# Patient Record
Sex: MALE | Race: WHITE | NOT HISPANIC OR LATINO | Employment: OTHER | URBAN - METROPOLITAN AREA
[De-identification: names, ages, dates, MRNs, and addresses within clinical notes are randomized per-mention and may not be internally consistent; named-entity substitution may affect disease eponyms.]

---

## 2019-07-06 ENCOUNTER — OFFICE VISIT (OUTPATIENT)
Dept: URGENT CARE | Facility: CLINIC | Age: 79
End: 2019-07-06
Payer: MEDICARE

## 2019-07-06 ENCOUNTER — HOSPITAL ENCOUNTER (OUTPATIENT)
Dept: LAB | Facility: MEDICAL CENTER | Age: 79
End: 2019-07-06
Attending: NURSE PRACTITIONER
Payer: MEDICARE

## 2019-07-06 VITALS
WEIGHT: 195 LBS | DIASTOLIC BLOOD PRESSURE: 78 MMHG | HEIGHT: 70 IN | SYSTOLIC BLOOD PRESSURE: 112 MMHG | HEART RATE: 65 BPM | BODY MASS INDEX: 27.92 KG/M2 | TEMPERATURE: 98.7 F | RESPIRATION RATE: 18 BRPM | OXYGEN SATURATION: 95 %

## 2019-07-06 DIAGNOSIS — R10.32 LEFT LOWER QUADRANT PAIN: ICD-10-CM

## 2019-07-06 DIAGNOSIS — J02.9 SORE THROAT: ICD-10-CM

## 2019-07-06 DIAGNOSIS — J03.90 TONSILLITIS: ICD-10-CM

## 2019-07-06 DIAGNOSIS — R30.0 DYSURIA: ICD-10-CM

## 2019-07-06 LAB
ALBUMIN SERPL BCP-MCNC: 4.3 G/DL (ref 3.2–4.9)
ALBUMIN/GLOB SERPL: 1.4 G/DL
ALP SERPL-CCNC: 109 U/L (ref 30–99)
ALT SERPL-CCNC: 45 U/L (ref 2–50)
ANION GAP SERPL CALC-SCNC: 9 MMOL/L (ref 0–11.9)
APPEARANCE UR: NORMAL
AST SERPL-CCNC: 31 U/L (ref 12–45)
BASOPHILS # BLD AUTO: 1 % (ref 0–1.8)
BASOPHILS # BLD: 0.08 K/UL (ref 0–0.12)
BILIRUB SERPL-MCNC: 0.7 MG/DL (ref 0.1–1.5)
BILIRUB UR STRIP-MCNC: NORMAL MG/DL
BUN SERPL-MCNC: 27 MG/DL (ref 8–22)
CALCIUM SERPL-MCNC: 10.4 MG/DL (ref 8.5–10.5)
CHLORIDE SERPL-SCNC: 106 MMOL/L (ref 96–112)
CO2 SERPL-SCNC: 23 MMOL/L (ref 20–33)
COLOR UR AUTO: NORMAL
CREAT SERPL-MCNC: 1.29 MG/DL (ref 0.5–1.4)
EOSINOPHIL # BLD AUTO: 0.12 K/UL (ref 0–0.51)
EOSINOPHIL NFR BLD: 1.4 % (ref 0–6.9)
ERYTHROCYTE [DISTWIDTH] IN BLOOD BY AUTOMATED COUNT: 51.7 FL (ref 35.9–50)
GLOBULIN SER CALC-MCNC: 3.1 G/DL (ref 1.9–3.5)
GLUCOSE SERPL-MCNC: 114 MG/DL (ref 65–99)
GLUCOSE UR STRIP.AUTO-MCNC: NORMAL MG/DL
HCT VFR BLD AUTO: 49.8 % (ref 42–52)
HGB BLD-MCNC: 16.9 G/DL (ref 14–18)
IMM GRANULOCYTES # BLD AUTO: 0.03 K/UL (ref 0–0.11)
IMM GRANULOCYTES NFR BLD AUTO: 0.4 % (ref 0–0.9)
INT CON NEG: NORMAL
INT CON POS: NORMAL
KETONES UR STRIP.AUTO-MCNC: NORMAL MG/DL
LEUKOCYTE ESTERASE UR QL STRIP.AUTO: NORMAL
LYMPHOCYTES # BLD AUTO: 1.97 K/UL (ref 1–4.8)
LYMPHOCYTES NFR BLD: 23.5 % (ref 22–41)
MCH RBC QN AUTO: 33.1 PG (ref 27–33)
MCHC RBC AUTO-ENTMCNC: 33.9 G/DL (ref 33.7–35.3)
MCV RBC AUTO: 97.6 FL (ref 81.4–97.8)
MONOCYTES # BLD AUTO: 0.93 K/UL (ref 0–0.85)
MONOCYTES NFR BLD AUTO: 11.1 % (ref 0–13.4)
NEUTROPHILS # BLD AUTO: 5.24 K/UL (ref 1.82–7.42)
NEUTROPHILS NFR BLD: 62.6 % (ref 44–72)
NITRITE UR QL STRIP.AUTO: NORMAL
NRBC # BLD AUTO: 0 K/UL
NRBC BLD-RTO: 0 /100 WBC
PH UR STRIP.AUTO: 5.5 [PH] (ref 5–8)
PLATELET # BLD AUTO: 263 K/UL (ref 164–446)
PMV BLD AUTO: 10.3 FL (ref 9–12.9)
POTASSIUM SERPL-SCNC: 4.2 MMOL/L (ref 3.6–5.5)
PROT SERPL-MCNC: 7.4 G/DL (ref 6–8.2)
PROT UR QL STRIP: NORMAL MG/DL
RBC # BLD AUTO: 5.1 M/UL (ref 4.7–6.1)
RBC UR QL AUTO: NORMAL
S PYO AG THROAT QL: NEGATIVE
SODIUM SERPL-SCNC: 138 MMOL/L (ref 135–145)
SP GR UR STRIP.AUTO: 1.02
UROBILINOGEN UR STRIP-MCNC: 0.2 MG/DL
WBC # BLD AUTO: 8.4 K/UL (ref 4.8–10.8)

## 2019-07-06 PROCEDURE — 85025 COMPLETE CBC W/AUTO DIFF WBC: CPT

## 2019-07-06 PROCEDURE — 99204 OFFICE O/P NEW MOD 45 MIN: CPT | Performed by: NURSE PRACTITIONER

## 2019-07-06 PROCEDURE — 36415 COLL VENOUS BLD VENIPUNCTURE: CPT

## 2019-07-06 PROCEDURE — 80053 COMPREHEN METABOLIC PANEL: CPT

## 2019-07-06 PROCEDURE — 87880 STREP A ASSAY W/OPTIC: CPT | Performed by: NURSE PRACTITIONER

## 2019-07-06 PROCEDURE — 81002 URINALYSIS NONAUTO W/O SCOPE: CPT | Performed by: NURSE PRACTITIONER

## 2019-07-06 RX ORDER — AMOXICILLIN AND CLAVULANATE POTASSIUM 875; 125 MG/1; MG/1
1 TABLET, FILM COATED ORAL 2 TIMES DAILY
Qty: 20 TAB | Refills: 0 | Status: SHIPPED | OUTPATIENT
Start: 2019-07-06 | End: 2019-07-16

## 2019-07-06 ASSESSMENT — ENCOUNTER SYMPTOMS
BLOOD IN STOOL: 0
SWEATS: 0
HEADACHES: 0
NECK PAIN: 0
BLURRED VISION: 0
ARTHRALGIAS: 0
COUGH: 0
SORE THROAT: 1
BELCHING: 0
SWOLLEN GLANDS: 1
STRIDOR: 0
ABDOMINAL PAIN: 1
TROUBLE SWALLOWING: 1
HOARSE VOICE: 0
DOUBLE VISION: 0
FLANK PAIN: 0
NAUSEA: 0
DIARRHEA: 0
FEVER: 0
FLATUS: 0
DIZZINESS: 0
MYALGIAS: 0
PALPITATIONS: 0
HEMATOCHEZIA: 0
VOMITING: 0
WHEEZING: 0
CONSTIPATION: 0
MUSCULOSKELETAL NEGATIVE: 1
CHILLS: 0

## 2019-07-06 NOTE — PROGRESS NOTES
Subjective:   Mickey Rivas is a 79 y.o. male who presents for Abdominal Pain (3.5 weeks ago- sore throat and LLQ pain,stomache-sore throat left and came back-stomach pain on and off,indigestion and constipation )        Abdominal Pain   This is a new problem. The current episode started in the past 7 days. The onset quality is gradual. The problem occurs intermittently. The problem has been waxing and waning. The pain is located in the LLQ (States with LLQ pain a week ago and has now resolved. denies fever or chills. States with hx of diverticulosis. States he [place] and recommended labs and imaging). The pain is mild. The quality of the pain is aching. The abdominal pain does not radiate. Associated symptoms include dysuria and frequency. Pertinent negatives include no arthralgias, belching, constipation, diarrhea, fever, flatus, headaches, hematochezia, hematuria, melena, myalgias, nausea or vomiting. Nothing aggravates the pain. The pain is relieved by nothing. He has tried nothing for the symptoms. Improvement on treatment: Symptoms resolved on their own.   Pharyngitis    This is a new problem. The current episode started 1 to 4 weeks ago (Started 3.5 weeks ago). The problem has been unchanged. Neither side of throat is experiencing more pain than the other. There has been no fever. The pain is moderate. Associated symptoms include abdominal pain, swollen glands and trouble swallowing. Pertinent negatives include no congestion, coughing, diarrhea, ear discharge, ear pain, headaches, hoarse voice, plugged ear sensation, neck pain, stridor or vomiting. He has tried nothing for the symptoms. The treatment provided no relief.   Dysuria    This is a new problem. The current episode started 1 to 4 weeks ago. The problem occurs intermittently. The problem has been waxing and waning. The quality of the pain is described as aching and burning. He is not sexually active. There is no history  "of pyelonephritis. Associated symptoms include frequency. Pertinent negatives include no chills, discharge, flank pain, hematuria, hesitancy, nausea, sweats, urgency or vomiting. He has tried nothing for the symptoms. The treatment provided no relief.        Review of Systems   Constitutional: Negative for chills and fever.   HENT: Positive for sore throat and trouble swallowing. Negative for congestion, ear discharge, ear pain and hoarse voice.    Eyes: Negative for blurred vision and double vision.   Respiratory: Negative for cough, wheezing and stridor.    Cardiovascular: Negative for chest pain and palpitations.   Gastrointestinal: Positive for abdominal pain. Negative for blood in stool, constipation, diarrhea, flatus, hematochezia, melena, nausea and vomiting.   Genitourinary: Positive for dysuria and frequency. Negative for flank pain, hematuria, hesitancy and urgency.   Musculoskeletal: Negative.  Negative for arthralgias, myalgias and neck pain.   Skin: Negative.  Negative for itching and rash.   Neurological: Negative for dizziness and headaches.   All other systems reviewed and are negative.    PMH:  has a past medical history of Hypertension.  MEDS:   Current Outpatient Prescriptions:   •  amoxicillin-clavulanate (AUGMENTIN) 875-125 MG Tab, Take 1 Tab by mouth 2 times a day for 10 days., Disp: 20 Tab, Rfl: 0  •  LIPITOR PO, Take  by mouth., Disp: , Rfl:   •  AMBIEN PO, Take  by mouth., Disp: , Rfl:   ALLERGIES: No Known Allergies  SURGHX:   Past Surgical History:   Procedure Laterality Date   • OTHER ORTHOPEDIC SURGERY      left hip replacement     SOCHX:  reports that he has never smoked. He has never used smokeless tobacco. He reports that he drinks alcohol. He reports that he does not use drugs.  FH: Family history was reviewed, no pertinent findings to report     Objective:   /78 (BP Location: Left arm)   Pulse 65   Temp 37.1 °C (98.7 °F) (Temporal)   Resp 18   Ht 1.778 m (5' 10\")   Wt " 88.5 kg (195 lb)   SpO2 95%   BMI 27.98 kg/m²   Physical Exam   Constitutional: He is oriented to person, place, and time. He appears well-developed and well-nourished. No distress.   HENT:   Head: Normocephalic.   Right Ear: Hearing, tympanic membrane and ear canal normal. Tympanic membrane is not erythematous. No middle ear effusion.   Left Ear: Hearing, tympanic membrane and ear canal normal. Tympanic membrane is not erythematous.  No middle ear effusion.   Nose: No rhinorrhea. Right sinus exhibits no maxillary sinus tenderness and no frontal sinus tenderness. Left sinus exhibits no maxillary sinus tenderness and no frontal sinus tenderness.   Mouth/Throat: Uvula is midline and mucous membranes are normal. Posterior oropharyngeal erythema present. Tonsils are 1+ on the right. Tonsils are 1+ on the left. No tonsillar exudate.   Eyes: Pupils are equal, round, and reactive to light. Conjunctivae, EOM and lids are normal.   Neck: Normal range of motion. No thyromegaly present.   Cardiovascular: Normal rate, regular rhythm and normal heart sounds.    Pulmonary/Chest: Effort normal and breath sounds normal. No respiratory distress. He has no wheezes.   Abdominal: Soft. Normal appearance and bowel sounds are normal. There is no hepatosplenomegaly. There is no tenderness. There is no rigidity, no rebound, no guarding, no CVA tenderness, no tenderness at McBurney's point and negative Manzano's sign. No hernia.   No area of tenderness identified   Lymphadenopathy:        Head (right side): No submandibular and no tonsillar adenopathy present.        Head (left side): Tonsillar adenopathy present. No submandibular adenopathy present.   Neurological: He is alert and oriented to person, place, and time.   Skin: Skin is warm and dry. No rash noted. He is not diaphoretic.   Psychiatric: He has a normal mood and affect. His speech is normal and behavior is normal. Judgment and thought content normal.   Vitals reviewed.       "  Assessment/Plan:   Assessment    1. Sore throat  - POCT Rapid Strep A  - amoxicillin-clavulanate (AUGMENTIN) 875-125 MG Tab; Take 1 Tab by mouth 2 times a day for 10 days.  Dispense: 20 Tab; Refill: 0    2. Dysuria  - POCT Urinalysis    3. Left lower quadrant pain  - CBC WITH DIFFERENTIAL; Future  - Comp Metabolic Panel; Future  - amoxicillin-clavulanate (AUGMENTIN) 875-125 MG Tab; Take 1 Tab by mouth 2 times a day for 10 days.  Dispense: 20 Tab; Refill: 0    4. Tonsillitis    Strep negative. UA negative. Even though strep test was negative, given patient's physical exam and persistence of sore throat for 3 weeks, I believe antibiotics are indicated at this time.  Since without tenderness to abdomen and no fever/diarrhea, I do not feel like CT scan at this time even though patient is requesting. Patient persists about having labs drawn at this time to \"check for infection.\"    Follow up with a PCP within 7-10 days    CBC and CMP unremarkable - non fasting. Patient encouraged to increase clear liquid intake    Differential diagnosis, natural history, supportive care, and indications for immediate follow-up discussed.       "

## 2019-07-10 ENCOUNTER — TELEPHONE (OUTPATIENT)
Dept: URGENT CARE | Facility: CLINIC | Age: 79
End: 2019-07-10

## 2019-07-11 NOTE — TELEPHONE ENCOUNTER
Attempted to reach patient on phone number he requested.  No voicemail box was set up.  Attempted to reach patient on his mobile number, left message informing patient of my coverage of La's calls and advised patient to call clinic to further discuss.

## 2019-07-11 NOTE — TELEPHONE ENCOUNTER
Patient has a couple of questions for you he did not want to discus with me he will like to get a call back to the following number [phone]    Thank You

## 2020-01-13 ENCOUNTER — OFFICE (OUTPATIENT)
Dept: URBAN - METROPOLITAN AREA CLINIC 128 | Facility: CLINIC | Age: 80
End: 2020-01-13

## 2020-01-13 VITALS
DIASTOLIC BLOOD PRESSURE: 76 MMHG | SYSTOLIC BLOOD PRESSURE: 122 MMHG | HEART RATE: 73 BPM | HEIGHT: 70 IN | WEIGHT: 199 LBS

## 2020-01-13 DIAGNOSIS — R10.9 ABDOMINAL PAIN: ICD-10-CM

## 2020-01-13 DIAGNOSIS — K21.9 GASTROESOPHAGEAL REFLUX DISEASE: ICD-10-CM

## 2020-01-13 DIAGNOSIS — R07.89 CHEST PAIN: ICD-10-CM

## 2020-01-13 PROCEDURE — 99204 OFFICE O/P NEW MOD 45 MIN: CPT | Performed by: INTERNAL MEDICINE

## 2020-01-13 NOTE — SERVICEHPINOTES
Kindly requested by Dr. Walker to see how in consultation for.  Bello is a 79-year-old man with history of coronary arteriosclerosis status post CABG 2016, hypertension, gastroesophageal reflux disease, gout of the left foot.   About three years ago on routine FONT style="BACKGROUND-COLOR: #ffffff" visited="true"cardiac imaging he was found to have coronary artery disease necessitating bypass surgery. At the time he was asymptomatic with no angina. About a month after his CABG he developed bilateral scapula/shoulder pain and chest pain. Recent nuclear stress testing has been negative for any active coronary disease, and he's here to evaluate whether or not acid reflux could be the cause of his chest pain and shoulder pain.He is on indomethacin/Motrin for left foot gout which seems exacerbated his stomach issues. Because of this he was started on omeprazole 20 mg per day, which was then increased to 40 mg per day two years ago. On this regimen nausea improved, but chest pain did not significantly improve. He gets symptoms in the middle of the night, and describes primarily left sided chest pain associated with lying supine. This will also occur in his bilateral shoulder blades, and he has only mild acid reflux disease manifest with heartburn.  He says the symptoms are worsened with insomnia, but not with exercise. He swims minimally 40 minutes per day. He also describes excessive gas and bloating and burps throughout the day. /FONT

## 2020-02-24 ENCOUNTER — OFFICE (OUTPATIENT)
Dept: URBAN - METROPOLITAN AREA CLINIC 128 | Facility: CLINIC | Age: 80
End: 2020-02-24

## 2020-02-24 VITALS — HEIGHT: 70 IN

## 2020-02-24 DIAGNOSIS — R07.89 CHEST PAIN: ICD-10-CM

## 2020-02-24 DIAGNOSIS — K21.9 GASTROESOPHAGEAL REFLUX DISEASE: ICD-10-CM

## 2020-02-24 DIAGNOSIS — K44.9 HIATAL HERNIA: ICD-10-CM

## 2020-02-24 DIAGNOSIS — R10.9 ABDOMINAL PAIN: ICD-10-CM

## 2020-02-24 DIAGNOSIS — M19.90 ARTHRITIS: ICD-10-CM

## 2020-02-24 PROCEDURE — 99213 OFFICE O/P EST LOW 20 MIN: CPT

## 2020-02-24 NOTE — SERVICEHPINOTES
BELLO WHITNEY   returns today for follow-up from last visit on   1/13/2020  .   Recall that Bello is a 79-year-old man with history of coronary arteriosclerosis status post CABG 2016, hypertension, gastroesophageal reflux disease, gout of the left foot. About three years ago on routine cardiac imaging he was found to have coronary artery disease necessitating bypass surgery. At the time he was asymptomatic with no angina. About a month after his CABG he developed bilateral scapula/shoulder pain and chest pain. Recent nuclear stress testing has been negative for any active coronary disease.He is on indomethacin/Motrin for left foot gout which seems exacerbated his stomach issues. Because of this he was started on omeprazole 20 mg per day, which was then increased to 40 mg per day two years ago. On this regimen nausea improved, but chest pain did not significantly improve. He gets symptoms in the middle of the night, and describes primarily left sided chest pain associated with lying supine. This will also occur in his bilateral shoulder blades, and he has only mild acid reflux disease manifest with heartburn. He says the symptoms are worsened with insomnia, but not with exercise. He swims minimally 40 minutes per day. He also describes excessive gas and bloating and burps throughout the day.    Lisseth underwent upper endoscopy for diagnosis and therapy was found to have a large 4 cm hiatal hernia and reflux esophagitis. He was switched over to Dexilant 60 mg daily and notes 8% improvement of chest pain on this regimen. He finds that his fatigue is somewhat better and that often he is able to sleep at night time and take a nap during the day as the pain is improved.

## 2020-04-20 ENCOUNTER — OFFICE (OUTPATIENT)
Dept: URBAN - METROPOLITAN AREA CLINIC 128 | Facility: CLINIC | Age: 80
End: 2020-04-20

## 2020-04-20 VITALS — HEIGHT: 70 IN

## 2020-04-20 DIAGNOSIS — R19.7 DIARRHEA (UNSPECIFIED): ICD-10-CM

## 2020-04-20 PROCEDURE — G0406 INPT/TELE FOLLOW UP 15: HCPCS | Performed by: INTERNAL MEDICINE

## 2020-04-20 PROCEDURE — 99214 OFFICE O/P EST MOD 30 MIN: CPT | Performed by: INTERNAL MEDICINE

## 2020-04-20 RX ORDER — CHOLESTYRAMINE 4 G/9G
4 POWDER, FOR SUSPENSION ORAL
Qty: 90 | Status: ACTIVE
Start: 2020-04-20

## 2020-04-20 NOTE — SERVICEHPINOTES
The patient is scheduled today for a telehealth visit, due to current mandate for social distancing on account of the COVID-19 Pandemic. The clinician is connected to a secure network. The patient consents to this teleconference being a billable service.   This visit used [employer] for a live, interactive audio and video telehealth visit.    BELLO WHITNEY   returns today for follow-up from last visit on   2/24/2020  .    Recall that Bello is a 79-year-old man with history of coronary arteriosclerosis status post CABG 2016, hypertension, gastroesophageal reflux disease, gout of the left foot. About three years ago on routine cardiac imaging he was found to have coronary artery disease necessitating bypass surgery. At the time he was asymptomatic with no angina. About a month after his CABG he developed bilateral scapula/shoulder pain and chest pain. Recent nuclear stress testing has been negative for any active coronary disease.He is on indomethacin/Motrin for left foot gout which seems exacerbated his stomach issues. Because of this he was started on omeprazole 20 mg per day, which was then increased to 40 mg per day two years ago. On this regimen nausea improved, but chest pain did not significantly improve. He gets symptoms in the middle of the night, and describes primarily left sided chest pain associated with lying supine. This will also occur in his bilateral shoulder blades, and he has only mild acid reflux disease manifest with heartburn. He says the symptoms are worsened with insomnia, but not with exercise. He swims minimally 40 minutes per day. He also describes excessive gas and bloating and burps throughout the day. Lisseth underwent upper endoscopy for diagnosis and therapy was found to have a large 4 cm hiatal hernia and reflux esophagitis. He was switched over to Dexilant 60 mg daily and notes 80% improvement of chest pain on this regimen. He finds that his fatigue is somewhat better and that often he is able to sleep at night time and take a nap during the day as the pain is improved.    BRToday he presents for 10 days of diarrhea associated with lower quadrant bilateral cramping. The patient complains of diarrhea.    Diarrheal symptoms started   7 - 10  days   ago.   Bowel movements have been occurring   3 - 5   times per day.    At the onset, diarrhea started   abruptly  .   Stools have been occurring   after meals and at random times of the day  .   The stool is described as   watery   in consistency.   The patient   has not   seen blood in the stool.   The patient has also noted   fecal urgency  .   Possible exposures/etiologies include   none, as he has been in [facility]  .    Suspected exacerbating factors include   milk/lactose and fatty/greasy foods  .   Diarrhea is alleviated so far by   fasting  .   The patient has already tried taking   Pepto Bismol  .   Has noted   no   relief.    He has not had any fevers.

## 2020-04-27 ENCOUNTER — OFFICE (OUTPATIENT)
Dept: URBAN - METROPOLITAN AREA CLINIC 128 | Facility: CLINIC | Age: 80
End: 2020-04-27

## 2020-04-27 VITALS — HEIGHT: 70 IN

## 2020-04-27 DIAGNOSIS — R19.7 DIARRHEA (UNSPECIFIED): ICD-10-CM

## 2020-04-27 DIAGNOSIS — K21.9 GASTROESOPHAGEAL REFLUX DISEASE: ICD-10-CM

## 2020-04-27 PROCEDURE — 99214 OFFICE O/P EST MOD 30 MIN: CPT | Performed by: INTERNAL MEDICINE

## 2020-04-27 PROCEDURE — G0406 INPT/TELE FOLLOW UP 15: HCPCS | Performed by: INTERNAL MEDICINE

## 2020-04-27 NOTE — SERVICEHPINOTES
The patient is scheduled today for a telehealth visit, due to current mandate for social distancing on account of the COVID-19 Pandemic. The clinician is connected to a secure network. The patient consents to this teleconference being a billable service. This visit used [employer] for a live, interactive audio and video telehealth visit.   BELLO WHITNEY   presents for a follow-up   visit via telehealth.  Patient was last seen on   4/20/2020   Recall that Bello is a 79-year-old man with history of coronary arteriosclerosis status post CABG 2016, hypertension, gastroesophageal reflux disease, gout of the left foot. About three years ago on routine cardiac imaging he was found to have coronary artery disease necessitating bypass surgery. At the time he was asymptomatic with no angina. About a month after his CABG he developed bilateral scapula/shoulder pain and chest pain. Recent nuclear stress testing has been negative for any active coronary disease.He is on indomethacin/Motrin for left foot gout which seems exacerbated his stomach issues. Because of this he was started on omeprazole 20 mg per day, which was then increased to 40 mg per day two years ago. On this regimen nausea improved, but chest pain did not significantly improve. He gets symptoms in the middle of the night, and describes primarily left sided chest pain associated with lying supine. This will also occur in his bilateral shoulder blades, and he has only mild acid reflux disease manifest with heartburn. He says the symptoms are worsened with insomnia, but not with exercise. He swims minimally 40 minutes per day. He also describes excessive gas and bloating and burps throughout the day. Lisseth underwent upper endoscopy for diagnosis and therapy was found to have a large 4 cm hiatal hernia and reflux esophagitis. He was switched over to Dexilant 60 mg daily and notes 80% improvement of chest pain on this regimen. He finds that his fatigue is somewhat better and that often he is able to sleep at night time and take a nap during the day as the pain is improved. BRLast week though, he had 10 days of diarrhea associated with lower quadrant bilateral cramping. We did stool culture and C diff PCR which was negative for infection, and diarrhea was symptomatically improved on Questran.  There is family history of ulcerative colitisBR  Fecal lactoferrin was very 3x ULN at 86 suggestive of active inflammation or infection.

## 2020-05-07 ENCOUNTER — OFFICE (OUTPATIENT)
Dept: URBAN - METROPOLITAN AREA CLINIC 128 | Facility: CLINIC | Age: 80
End: 2020-05-07

## 2020-05-07 VITALS — HEIGHT: 70 IN

## 2020-05-07 DIAGNOSIS — M19.90 ARTHRITIS: ICD-10-CM

## 2020-05-07 DIAGNOSIS — K44.9 HIATAL HERNIA: ICD-10-CM

## 2020-05-07 DIAGNOSIS — R07.89 CHEST PAIN: ICD-10-CM

## 2020-05-07 PROCEDURE — G0406 INPT/TELE FOLLOW UP 15: HCPCS | Performed by: INTERNAL MEDICINE

## 2020-05-07 PROCEDURE — 99214 OFFICE O/P EST MOD 30 MIN: CPT | Performed by: INTERNAL MEDICINE

## 2020-05-07 RX ORDER — ESOMEPRAZOLE MAGNESIUM 40 MG/1
40 CAPSULE, DELAYED RELEASE ORAL
Qty: 90 | Status: ACTIVE
Start: 2020-05-07

## 2020-05-07 NOTE — SERVICEHPINOTES
The patient is scheduled today for a telehealth visit, due to current mandate for social distancing on account of the COVID-19 Pandemic. The clinician is connected to a secure network. The patient consents to this teleconference being a billable service.   This visit used [employer] for a live, interactive audio and video telehealth visit.     BELLO WHITNEY   returns today for follow-up from last visit on   4/27/2020  .     Recall that Bello is a 79-year-old man with history of coronary arteriosclerosis status post CABG 2016, hypertension, gastroesophageal reflux disease, gout of the left foot. About three years ago on routine cardiac imaging he was found to have coronary artery disease necessitating bypass surgery. At the time he was asymptomatic with no angina. About a month after his CABG he developed bilateral scapula/shoulder pain and chest pain. Recent nuclear stress testing has been negative for any active coronary disease.He is on indomethacin/Motrin for left foot gout which seems exacerbated his stomach issues. Because of this he was started on omeprazole 20 mg per day, which was then increased to 40 mg per day two years ago. On this regimen nausea improved, but chest pain did not significantly improve. He gets symptoms in the middle of the night, and describes primarily left sided chest pain associated with lying supine. This will also occur in his bilateral shoulder blades, and he has only mild acid reflux disease manifest with heartburn. He says the symptoms are worsened with insomnia, but not with exercise. He swims minimally 40 minutes per day. He also describes excessive gas and bloating and burps throughout the day. Lisseth underwent upper endoscopy for diagnosis and therapy was found to have a large 4 cm hiatal hernia and reflux esophagitis. He was switched over to Dexilant 60 mg daily and notes 80% improvement of chest pain on this regimen. He finds that his fatigue is somewhat better and that often he is able to sleep at night time and take a nap during the day as the pain is improved. BRLast week though, he had 10 days of diarrhea associated with lower quadrant bilateral cramping. We did stool culture and C diff PCR which was negative for infection, and diarrhea was symptomatically improved on Questran. There is family history of ulcerative colitisBRFecal lactoferrin was very 3x ULN at 86 suggestive of active inflammation or infection. We discussed DDX as either infection or IBD and elected to try him on Cipro and Flagy, the symptoms resolved   within 1 day of antibiotics and completely 7 days. He also discontinued the Dexilant last Tuesday, as he was reasonably concerned that hypoacidity was increasing his risk of enteric infections. Last night he had sour brash, regurgitation, and chest pain off Dexilant 7 days.  He saw his cardiologist who felt this was not his CAD.

## 2021-06-02 ENCOUNTER — OFFICE (OUTPATIENT)
Dept: URBAN - METROPOLITAN AREA CLINIC 128 | Facility: CLINIC | Age: 81
End: 2021-06-02

## 2021-06-02 VITALS — HEIGHT: 70 IN

## 2021-06-02 DIAGNOSIS — K52.9 COLITIS, OTHER: ICD-10-CM

## 2021-06-02 DIAGNOSIS — K59.00 CONSTIPATION: ICD-10-CM

## 2021-06-02 DIAGNOSIS — K21.9 GASTROESOPHAGEAL REFLUX DISEASE: ICD-10-CM

## 2021-06-02 PROCEDURE — G0406 INPT/TELE FOLLOW UP 15: HCPCS | Performed by: INTERNAL MEDICINE

## 2021-06-02 PROCEDURE — 99213 OFFICE O/P EST LOW 20 MIN: CPT | Performed by: INTERNAL MEDICINE

## 2021-06-02 RX ORDER — LINACLOTIDE 290 UG/1
CAPSULE, GELATIN COATED ORAL
Qty: 30 | Status: ACTIVE
Start: 2021-06-02

## 2021-06-02 RX ORDER — SODIUM PHOSPHATE, DIBASIC AND SODIUM PHOSPHATE, MONOBASIC 7; 19 G/230ML; G/230ML
ENEMA RECTAL
Qty: 7 | Status: ACTIVE
Start: 2021-06-02

## 2021-06-02 NOTE — SERVICEHPINOTES
Please recall the following previous visits:The patient is scheduled today for a telehealth visit, due to current mandate for social distancing on account of the COVID-19 Pandemic. The clinician is connected to a secure network. The patient consents to this teleconference being a billable service. This visit used [employer] for a live, interactive audio and video telehealth visit. BELLO WHITNEY returns today for follow-up from last visit on 4/27/2020. Recall that Bello is a 79-year-old man with history of coronary arteriosclerosis status post CABG 2016, hypertension, gastroesophageal reflux disease, gout of the left foot. About three years ago on routine cardiac imaging he was found to have coronary artery disease necessitating bypass surgery. At the time he was asymptomatic with no angina. About a month after his CABG he developed bilateral scapula/shoulder pain and chest pain. Recent nuclear stress testing has been negative for any active coronary disease.He is on indomethacin/Motrin for left foot gout which seems exacerbated his stomach issues. Because of this he was started on omeprazole 20 mg per day, which was then increased to 40 mg per day two years ago. On this regimen nausea improved, but chest pain did not significantly improve. He gets symptoms in the middle of the night, and describes primarily left sided chest pain associated with lying supine. This will also occur in his bilateral shoulder blades, and he has only mild acid reflux disease manifest with heartburn. He says the symptoms are worsened with insomnia, but not with exercise. He swims minimally 40 minutes per day. He also describes excessive gas and bloating and burps throughout the day. Lisseth underwent upper endoscopy for diagnosis and therapy was found to have a large 4 cm hiatal hernia and reflux esophagitis. He was switched over to Dexilant 60 mg daily and notes 80% improvement of chest pain on this regimen. He finds that his fatigue is somewhat better and that often he is able to sleep at night time and take a nap during the day as the pain is improved. BRLast week though, he had 10 days of diarrhea associated with lower quadrant bilateral cramping. We did stool culture and C diff PCR which was negative for infection, and diarrhea was symptomatically improved on Questran. There is family history of ulcerative colitisBRFecal lactoferrin was very 3x ULN at 86 suggestive of active inflammation or infection. We discussed DDX as either infection or IBD and elected to try him on Cipro and Flagy, the symptoms resolved within 1 day of antibiotics and completely 7 days. He also discontinued the Dexilant last Tuesday, as he was reasonably concerned that hypoacidity was increasing his risk of enteric infections. Last night he had sour brash, regurgitation, and chest pain off Dexilant 7 days. He saw his cardiologist who felt this was not his CAD.Today:Today Bello discusses with us his hip surgery that occurred on May 10. On May 13 he had a pizza for dinner and  scratched his throat with some of the crust. He initially attributed the sore throat to his GERD. On May 20 he began having symptoms of GERD and called our office. He says he spoke with an on-call doctor who told him to double his Nexium. He did so and his GERD improved. He then began to experience constipation which he attributes to narcotic use after his surgery. He also experienced cramps, gas, and nausea, so he changed his diet to gentle foods such as chicken noodle soup and rice. This helped resolve some of his cramping, but not his constipation. He has been taking Col-rite twice a day as well as drinking prune juice which is helping minimally. He states he was told not to take aspirin or Motrin because of the antiplatelet effects, and was given Celebrex for pain. He took the Celebrex and the next day he noticed blood in his stool, so he stopped taking the Celebrex immediately.BR

## 2021-10-14 ENCOUNTER — OFFICE (OUTPATIENT)
Dept: URBAN - METROPOLITAN AREA CLINIC 128 | Facility: CLINIC | Age: 81
End: 2021-10-14

## 2021-10-14 VITALS — HEIGHT: 70 IN

## 2021-10-14 DIAGNOSIS — Z12.11 SCREENING FOR COLONIC NEOPLASIA: ICD-10-CM

## 2021-10-14 DIAGNOSIS — K21.9 GASTROESOPHAGEAL REFLUX DISEASE: ICD-10-CM

## 2021-10-14 DIAGNOSIS — K59.00 CONSTIPATION: ICD-10-CM

## 2021-10-14 PROCEDURE — 99213 OFFICE O/P EST LOW 20 MIN: CPT | Performed by: INTERNAL MEDICINE

## 2021-10-14 PROCEDURE — G0406 INPT/TELE FOLLOW UP 15: HCPCS | Performed by: INTERNAL MEDICINE

## 2021-10-14 RX ORDER — SODIUM SULFATE, MAGNESIUM SULFATE, AND POTASSIUM CHLORIDE 17.75; 2.7; 2.25 G/1; G/1; G/1
TABLET ORAL
Qty: 1 | Status: ACTIVE
Start: 2021-10-14

## 2021-10-14 NOTE — SERVICEHPINOTES
The patient is scheduled today for a telehealth visit, due to current mandate for social distancing on account of the COVID-19 Pandemic. The clinician is connected to a secure network. The patient consents to this teleconference being a billable service. ARMANDO WHITNEY   returns today for follow-up from last visit on   6/21/2021  .    Bello is here to discuss with us his hip surgery that occurred on May 10. On May 13 he had a pizza for dinner and scratched his throat with some of the crust. He initially attributed the sore throat to his GERD. On May 20 he began having symptoms of GERD and called our office. He says he spoke with an on-call doctor who told him to double his Nexium. He did so and his GERD improved. He then began to experience constipation which he attributes to narcotic use after his surgery. He also experienced cramps, gas, and nausea, so he changed his diet to gentle foods such as chicken noodle soup and rice. This helped resolve some of his cramping, but not his constipation. He has been taking Col-rite twice a day as well as drinking prune juice which is helping minimally. He states he was told not to take aspirin or Motrin because of the antiplatelet effects, and was given Celebrex for pain. He took the Celebrex and the next day he noticed blood in his stool, so he stopped taking the Celebrex immediately. Currently:ARMANDO Montanez is taking Docusate 50 mg daily, 1 Tylenol twice a day, and 2 Advil 200 mg at bedtime.  In general he is having good bowel movements.  His GERD is minimal, only with dysphagia with eating too quickly once a month. He has heartburn but is doing well on the Nexium 20 mg .  He is off all Narcotisc

## 2021-11-05 PROBLEM — K63.5 POLYP OF COLON: Status: ACTIVE | Noted: 2021-11-05

## 2021-11-05 PROBLEM — K64.1 SECOND DEGREE HEMORRHOIDS: Status: ACTIVE | Noted: 2021-11-05

## 2021-11-05 PROBLEM — K57.30 DVRTCLOS OF LG INT W/O PERFORATION OR ABSCESS W/O BLEEDING: Status: ACTIVE | Noted: 2021-11-05
